# Patient Record
Sex: FEMALE | Race: WHITE | Employment: OTHER | URBAN - METROPOLITAN AREA
[De-identification: names, ages, dates, MRNs, and addresses within clinical notes are randomized per-mention and may not be internally consistent; named-entity substitution may affect disease eponyms.]

---

## 2019-03-01 ENCOUNTER — HOSPITAL ENCOUNTER (OUTPATIENT)
Dept: MRI IMAGING | Age: 59
Discharge: HOME | End: 2019-03-01
Attending: INTERNAL MEDICINE
Payer: COMMERCIAL

## 2019-03-01 DIAGNOSIS — E22.9 PITUITARY HYPERFUNCTION (HCC): ICD-10-CM

## 2019-03-01 PROCEDURE — A9575 INJ GADOTERATE MEGLUMI 0.1ML: HCPCS

## 2019-03-01 PROCEDURE — 70553 MRI BRAIN STEM W/O & W/DYE: CPT

## 2019-03-01 PROCEDURE — 74011250636 HC RX REV CODE- 250/636

## 2019-03-01 RX ORDER — GADOTERATE MEGLUMINE 376.9 MG/ML
13 INJECTION INTRAVENOUS
Status: COMPLETED | OUTPATIENT
Start: 2019-03-01 | End: 2019-03-01

## 2019-03-01 RX ADMIN — GADOTERATE MEGLUMINE 13 ML: 376.9 INJECTION INTRAVENOUS at 10:10

## 2022-03-21 ENCOUNTER — OFFICE VISIT (OUTPATIENT)
Dept: SURGERY | Age: 62
End: 2022-03-21
Payer: COMMERCIAL

## 2022-03-21 DIAGNOSIS — Z12.31 ENCOUNTER FOR SCREENING MAMMOGRAM FOR MALIGNANT NEOPLASM OF BREAST: Primary | ICD-10-CM

## 2022-03-21 PROCEDURE — 99203 OFFICE O/P NEW LOW 30 MIN: CPT | Performed by: SURGERY

## 2022-03-21 RX ORDER — BISOPROLOL FUMARATE 5 MG/1
TABLET ORAL
COMMUNITY
Start: 2022-02-04

## 2022-03-21 RX ORDER — ESTRADIOL/NORETHINDRONE ACETATE TRANSDERMAL SYSTEM .05; .14 MG/D; MG/D
PATCH, EXTENDED RELEASE TRANSDERMAL
COMMUNITY
Start: 2022-03-05

## 2022-03-21 RX ORDER — LEVOTHYROXINE SODIUM 125 UG/1
62.5 TABLET ORAL
COMMUNITY

## 2022-03-21 RX ORDER — RABEPRAZOLE SODIUM 20 MG/1
40 TABLET, DELAYED RELEASE ORAL
COMMUNITY

## 2022-03-21 RX ORDER — CYANOCOBALAMIN (VITAMIN B-12) 500 MCG
10 TABLET ORAL DAILY
COMMUNITY

## 2022-03-21 RX ORDER — BUPROPION HYDROCHLORIDE 150 MG/1
150 TABLET, EXTENDED RELEASE ORAL 2 TIMES DAILY
COMMUNITY

## 2022-03-21 RX ORDER — METOCLOPRAMIDE 5 MG/1
5 TABLET ORAL 3 TIMES DAILY
COMMUNITY

## 2022-03-21 RX ORDER — HYDROXYZINE 25 MG/1
TABLET, FILM COATED ORAL
COMMUNITY
Start: 2022-02-03

## 2022-03-21 RX ORDER — LEVOCETIRIZINE DIHYDROCHLORIDE 5 MG/1
TABLET, FILM COATED ORAL
COMMUNITY

## 2022-03-21 RX ORDER — CYCLOSPORINE 0.5 MG/ML
1 EMULSION OPHTHALMIC 2 TIMES DAILY
COMMUNITY

## 2022-03-21 NOTE — PROGRESS NOTES
HISTORY OF PRESENT ILLNESS  Ana Jacob is a 64 y.o. female. HPI NEW patient consult referred by  YASMEEN Price for Abnormal mammogram. Has been having to come back to [facility] AND [facility] every six months. They switch from LEFT to RIGHT and back with things that have changed. Denies pain or changes to the breast area that she has noticed. Family History:  Maternal Aunt- Colon cancer      Mammogram, [facility] 8-12-21 , BIRADS 3      Review of Systems   HENT: Positive for ear pain. Respiratory: Positive for sputum production. Cardiovascular: Positive for palpitations. Gastrointestinal: Positive for abdominal pain, constipation and heartburn. Musculoskeletal: Positive for neck pain. Psychiatric/Behavioral: Positive for depression. The patient is nervous/anxious. All other systems reviewed and are negative.       Physical Exam    ASSESSMENT and PLAN  {ASSESSMENT/PLAN:86929}

## 2022-03-21 NOTE — LETTER
3/23/2022 1:33 PM    Patient:  Marcella Sierra   YOB: 1960  Date of Visit: 3/21/2022      Dear Gregory Huff:      Thank you for referring Ms. Rodney De La Rosa to me for evaluation/treatment. Below are the relevant portions of my assessment and plan of care. If you have questions, please do not hesitate to call me. I look forward to following Ms. Modi along with you.         Sincerely,    Gertrude Mosqueda MD

## 2022-03-21 NOTE — PROGRESS NOTES
HISTORY OF PRESENT ILLNESS  Lisy Eli is a 64 y.o. female. HPI  NEW patient consult referred by YASMEEN Shen for abnormal mammogram. Has been having to come back to [facility] every six months. They switch from LEFT to RIGHT and back with things that have changed. Denies pain or changes to the breast area that she has noticed.       Family History:  Maternal Aunt- Colon cancer      Mammogram, [facility] 8-12-21 , BIRADS 3        No past medical history on file. No past surgical history on file. Social History     Socioeconomic History    Marital status:      Spouse name: Not on file    Number of children: Not on file    Years of education: Not on file    Highest education level: Not on file   Occupational History    Not on file   Tobacco Use    Smoking status: Not on file    Smokeless tobacco: Not on file   Substance and Sexual Activity    Alcohol use: Not on file    Drug use: Not on file    Sexual activity: Not on file   Other Topics Concern    Not on file   Social History Narrative    Not on file     Social Determinants of Health     Financial Resource Strain:     Difficulty of Paying Living Expenses: Not on file   Food Insecurity:     Worried About Running Out of Food in the Last Year: Not on file    Bakari of Food in the Last Year: Not on file   Transportation Needs:     Lack of Transportation (Medical): Not on file    Lack of Transportation (Non-Medical):  Not on file   Physical Activity:     Days of Exercise per Week: Not on file    Minutes of Exercise per Session: Not on file   Stress:     Feeling of Stress : Not on file   Social Connections:     Frequency of Communication with Friends and Family: Not on file    Frequency of Social Gatherings with Friends and Family: Not on file    Attends Restorationist Services: Not on file    Active Member of Clubs or Organizations: Not on file    Attends Club or Organization Meetings: Not on file    Marital Status: Not on file   Intimate Partner Violence:     Fear of Current or Ex-Partner: Not on file    Emotionally Abused: Not on file    Physically Abused: Not on file    Sexually Abused: Not on file   Housing Stability:     Unable to Pay for Housing in the Last Year: Not on file    Number of Prashant in the Last Year: Not on file    Unstable Housing in the Last Year: Not on file       Current Outpatient Medications on File Prior to Visit   Medication Sig Dispense Refill    bisoprolol (ZEBETA) 5 mg tablet       buPROPion ER (ZYBAN,BUPROBAN) 150 mg ER tablet Take 150 mg by mouth two (2) times a day.  cholecalciferol (VITAMIN D3) (400 Units /10 mcg) tab tablet Take 10 mcg by mouth daily.  cycloSPORINE (RESTASIS) 0.05 % dpet 1 Drop two (2) times a day.  CombiPatch 0.05-0.14 mg/24 hr       hydrOXYzine HCL (ATARAX) 25 mg tablet       levocetirizine (XYZAL) 5 mg tablet Take  by mouth.  levothyroxine (SYNTHROID) 125 mcg tablet Take 62.5 mcg by mouth Daily (before breakfast).  metoclopramide HCl (REGLAN) 5 mg tablet Take 5 mg by mouth three (3) times daily.  RABEprazole (ACIPHEX) 20 mg TbEC Take 40 mg by mouth. No current facility-administered medications on file prior to visit. Not on File    OB History    No obstetric history on file. Obstetric Comments   Menarche 6, LMP 2010, # of children 1, age of 4st delivery 34, Hysterectomy/oophorectomy No/No, Breast bx Yes, history of breast feeding Yes, BCP Yes, Hormone therapy Yes             ROS  HENT: Positive for ear pain. Respiratory: Positive for sputum production. Cardiovascular: Positive for palpitations. Gastrointestinal: Positive for abdominal pain, constipation and heartburn. Musculoskeletal: Positive for neck pain. Psychiatric/Behavioral: Positive for depression. The patient is nervous/anxious. All other systems reviewed and are negative. Physical Exam  Exam conducted with a chaperone present.    Cardiovascular:      Rate and Rhythm: Normal rate and regular rhythm. Heart sounds: Normal heart sounds. Pulmonary:      Breath sounds: Normal breath sounds. Chest:   Breasts: Breasts are symmetrical.      Right: Normal. No swelling, bleeding, inverted nipple, mass, nipple discharge, skin change, tenderness, axillary adenopathy or supraclavicular adenopathy. Left: Normal. No swelling, bleeding, inverted nipple, mass, nipple discharge, skin change, tenderness, axillary adenopathy or supraclavicular adenopathy. Lymphadenopathy:      Cervical:      Right cervical: No superficial, deep or posterior cervical adenopathy. Left cervical: No superficial, deep or posterior cervical adenopathy. Upper Body:      Right upper body: No supraclavicular or axillary adenopathy. Left upper body: No supraclavicular or axillary adenopathy. ASSESSMENT and PLAN    ICD-10-CM ICD-9-CM    1. Encounter for screening mammogram for malignant neoplasm of breast  Z12.31 V76.12 BREANNE 3D LESVIA W MAMMO BI SCREENING INCL CAD     New patient presents for evaluation of abnormal mammogram, and is doing well overall. Physical exam today normal. mammogram reports reviewed. F/U with screening mammogram in August. This plan was reviewed with the patient and patient agrees. All questions were answered. Total time spent was 40 minutes.     Written by Radha Mckeon, as dictated by Dr. Paulina Washington MD.

## 2022-07-11 ENCOUNTER — HOSPITAL ENCOUNTER (OUTPATIENT)
Dept: MAMMOGRAPHY | Age: 62
Discharge: HOME | End: 2022-07-11
Attending: SURGERY
Payer: COMMERCIAL

## 2022-07-11 DIAGNOSIS — Z12.31 ENCOUNTER FOR SCREENING MAMMOGRAM FOR MALIGNANT NEOPLASM OF BREAST: ICD-10-CM

## 2022-07-11 PROCEDURE — 77063 BREAST TOMOSYNTHESIS BI: CPT

## 2023-08-03 ENCOUNTER — HOSPITAL ENCOUNTER (OUTPATIENT)
Facility: HOSPITAL | Age: 63
Discharge: HOME | End: 2023-08-03
Attending: SURGERY
Payer: COMMERCIAL

## 2023-08-03 VITALS — BODY MASS INDEX: 22.2 KG/M2 | HEIGHT: 64 IN | WEIGHT: 130 LBS

## 2023-08-03 DIAGNOSIS — Z12.31 SCREENING MAMMOGRAM FOR HIGH-RISK PATIENT: ICD-10-CM

## 2023-08-03 PROCEDURE — 77063 BREAST TOMOSYNTHESIS BI: CPT

## 2024-08-12 ENCOUNTER — HOSPITAL ENCOUNTER (OUTPATIENT)
Facility: HOSPITAL | Age: 64
Discharge: HOME | End: 2024-08-15
Payer: COMMERCIAL

## 2024-08-12 VITALS — HEIGHT: 64 IN | BODY MASS INDEX: 22.2 KG/M2 | WEIGHT: 130 LBS

## 2024-08-12 DIAGNOSIS — Z12.31 VISIT FOR SCREENING MAMMOGRAM: ICD-10-CM

## 2024-08-12 PROCEDURE — 77063 BREAST TOMOSYNTHESIS BI: CPT

## 2025-08-27 ENCOUNTER — TRANSCRIBE ORDERS (OUTPATIENT)
Facility: HOSPITAL | Age: 65
End: 2025-08-27

## 2025-08-27 DIAGNOSIS — Z12.31 VISIT FOR SCREENING MAMMOGRAM: Primary | ICD-10-CM

## 2025-08-28 ENCOUNTER — HOSPITAL ENCOUNTER (OUTPATIENT)
Facility: HOSPITAL | Age: 65
Discharge: HOME | End: 2025-08-31
Payer: MEDICARE

## 2025-08-28 DIAGNOSIS — Z12.31 VISIT FOR SCREENING MAMMOGRAM: ICD-10-CM

## 2025-08-28 PROCEDURE — 77063 BREAST TOMOSYNTHESIS BI: CPT
